# Patient Record
(demographics unavailable — no encounter records)

---

## 2025-01-14 NOTE — HISTORY OF PRESENT ILLNESS
[FreeTextEntry1] : 14y M w/ pmh of learning disability, speech delay, strabismus presenting for hospital follow up for R arm pain.   Patient was in school yesterday sitting in his desk and had sudden onset 10/10 pain throughout his entire R arm from shoulder to fingers. Pain was associated w/ inability to move arm, numbness, temperature change (went very cold) and color change (turned purple), and swelling he was taken to outpatient [facility] (Dr. Haley) who sent patient to ED, in ED was evalauted w/ shoulder XR which was wnl, and US of forearm (also wnl) and discharged reema. Pain/weakness/temperature and color change resolved on its own after approx. 8-9 hours. Patient awaoke this morning w/ full strength, normal sensation, no weakness, no color change, but w/ some intermittent sharp stabbing pains in upper arm. No other symptoms anywhere in the body, denies headche, LOC, seizure like movements, weakness or similar symptoms in any other limb. This has never happened to him before. He has a medical history of ADD (no longer on medication), an unidentified learning diablity (has an IEP in place), he receives ST and OT in school. Had bilateral strabismus (s/p repair) but has residual poor visual acuity in L eye, had hearing difficulties that resolved s/p adenoidectomy and after myringotomy tubes x2.

## 2025-01-14 NOTE — PHYSICAL EXAM
[Well-appearing] : well-appearing [Normocephalic] : normocephalic [Straight] : straight [No deformities] : no deformities [Alert] : alert [Well related, good eye contact] : well related, good eye contact [Normal speech and language] : normal speech and language [Follows instructions well] : follows instructions well [VFF] : VFF [Pupils reactive to light and accommodation] : pupils reactive to light and accommodation [Full extraocular movements] : full extraocular movements [Saccadic and smooth pursuits intact] : saccadic and smooth pursuits intact [No nystagmus] : no nystagmus [Normal facial sensation to light touch] : normal facial sensation to light touch [No facial asymmetry or weakness] : no facial asymmetry or weakness [Gross hearing intact] : gross hearing intact [Equal palate elevation] : equal palate elevation [Good shoulder shrug] : good shoulder shrug [Normal tongue movement] : normal tongue movement [Midline tongue, no fasciculations] : midline tongue, no fasciculations [R handed] : R handed [Normal axial and appendicular muscle tone] : normal axial and appendicular muscle tone [Gets up on table without difficulty] : gets up on table without difficulty [No pronator drift] : no pronator drift [Normal finger tapping and fine finger movements] : normal finger tapping and fine finger movements [No abnormal involuntary movements] : no abnormal involuntary movements [5/5 strength in proximal and distal muscles of arms and legs] : 5/5 strength in proximal and distal muscles of arms and legs [Walks and runs well] : walks and runs well [2+ biceps] : 2+ biceps [Triceps] : triceps [Knee jerks] : knee jerks [Ankle jerks] : ankle jerks [No ankle clonus] : no ankle clonus [Bilaterally] : bilaterally [Localizes LT and temperature] : localizes LT and temperature [No dysmetria on FTNT] : no dysmetria on FTNT [Good walking balance] : good walking balance [Normal gait] : normal gait [de-identified] : Visual acuity 20/25 in L eye, w/ inabiliy to read letters in correct order on examination.  [de-identified] : 2+ brachioradialis  [de-identified] : vibration, proprioception intact throughout

## 2025-01-14 NOTE — ASSESSMENT
[FreeTextEntry1] : 14y M w/ learning disability, speech delay, bilateral strabismus s/p correction presenting w/ now resolved R arm weakness/pain and associated numbness, color change and temperature change. Patient stable w/ nonfocal exam. Etiology of presentation unclear but given that it consisted of color change, temp change and pain suspect that may be vascular in nature rather than neurologic. Will evaluate C/T spine w/ MRI, and should continue to follow up ortho. Patient unable to read letters on visual acuity testing in correct order, which may be related to his learning disability, referred to [facility] for evaluation. RTC after MRI.

## 2025-01-14 NOTE — CONSULT LETTER
[Dear  ___] : Dear  [unfilled], [Consult Letter:] : I had the pleasure of evaluating your patient, [unfilled]. [Please see my note below.] : Please see my note below. [Consult Closing:] : Thank you very much for allowing me to participate in the care of this patient.  If you have any questions, please do not hesitate to contact me. [Sincerely,] : Sincerely, [FreeTextEntry3] : Yael Molina MD PGY- 4 Child Neurology [facility]  Bam Cotter MD Child Neurologist Professor of Pediatrics [facility]  [address], [address] [place] [address] Phone: [phone]

## 2025-01-14 NOTE — REASON FOR VISIT
[Hospital Follow-Up] : a hospital follow-up for [Other: ____] : [unfilled] [Patient] : patient [Mother] : mother

## 2025-02-19 NOTE — PHYSICAL EXAM
[de-identified] : Visual acuity 20/25 in L eye, w/ inabiliy to read letters in correct order on examination.  [de-identified] : 2+ brachioradialis  [de-identified] : vibration, proprioception intact throughout

## 2025-02-19 NOTE — PHYSICAL EXAM
[de-identified] : Visual acuity 20/25 in L eye, w/ inabiliy to read letters in correct order on examination.  [de-identified] : 2+ brachioradialis  [de-identified] : vibration, proprioception intact throughout

## 2025-02-19 NOTE — PHYSICAL EXAM
[de-identified] : Visual acuity 20/25 in L eye, w/ inabiliy to read letters in correct order on examination.  [de-identified] : 2+ brachioradialis  [de-identified] : vibration, proprioception intact throughout

## 2025-02-19 NOTE — PHYSICAL EXAM
[de-identified] : Visual acuity 20/25 in L eye, w/ inabiliy to read letters in correct order on examination.  [de-identified] : 2+ brachioradialis  [de-identified] : vibration, proprioception intact throughout

## 2025-02-19 NOTE — HISTORY OF PRESENT ILLNESS
[FreeTextEntry1] : 14y M w/ pmh of learning disability, speech delay, strabismus presenting for hospital follow up for R arm pain.   Patient was in school yesterday sitting in his desk and had sudden onset 10/10 pain throughout his entire R arm from shoulder to fingers. Pain was associated w/ inability to move arm, numbness, temperature change (went very cold) and color change (turned purple), and swelling he was taken to outpatient [facility] (Dr. Haley) who sent patient to ED, in ED was evalauted w/ shoulder XR which was wnl, and US of forearm (also wnl) and discharged erema. Pain/weakness/temperature and color change resolved on its own after approx. 8-9 hours. Patient awaoke this morning w/ full strength, normal sensation, no weakness, no color change, but w/ some intermittent sharp stabbing pains in upper arm. No other symptoms anywhere in the body, denies headche, LOC, seizure like movements, weakness or similar symptoms in any other limb. This has never happened to him before. He has a medical history of ADD (no longer on medication), an unidentified learning diablity (has an IEP in place), he receives ST and OT in school. Had bilateral strabismus (s/p repair) but has residual poor visual acuity in L eye, had hearing difficulties that resolved s/p adenoidectomy and after myringotomy tubes x2.

## 2025-02-19 NOTE — HISTORY OF PRESENT ILLNESS
[FreeTextEntry1] : 14y M w/ pmh of learning disability, speech delay, strabismus presenting for hospital follow up for R arm pain.   Patient was in school yesterday sitting in his desk and had sudden onset 10/10 pain throughout his entire R arm from shoulder to fingers. Pain was associated w/ inability to move arm, numbness, temperature change (went very cold) and color change (turned purple), and swelling he was taken to outpatient [facility] (Dr. Haley) who sent patient to ED, in ED was evalauted w/ shoulder XR which was wnl, and US of forearm (also wnl) and discharged reema. Pain/weakness/temperature and color change resolved on its own after approx. 8-9 hours. Patient awaoke this morning w/ full strength, normal sensation, no weakness, no color change, but w/ some intermittent sharp stabbing pains in upper arm. No other symptoms anywhere in the body, denies headche, LOC, seizure like movements, weakness or similar symptoms in any other limb. This has never happened to him before. He has a medical history of ADD (no longer on medication), an unidentified learning diablity (has an IEP in place), he receives ST and OT in school. Had bilateral strabismus (s/p repair) but has residual poor visual acuity in L eye, had hearing difficulties that resolved s/p adenoidectomy and after myringotomy tubes x2.  Patient